# Patient Record
Sex: FEMALE | Race: AMERICAN INDIAN OR ALASKA NATIVE
[De-identification: names, ages, dates, MRNs, and addresses within clinical notes are randomized per-mention and may not be internally consistent; named-entity substitution may affect disease eponyms.]

---

## 2019-04-08 ENCOUNTER — HOSPITAL ENCOUNTER (OUTPATIENT)
Dept: HOSPITAL 5 - MAMMO | Age: 69
Discharge: HOME | End: 2019-04-08
Attending: NURSE PRACTITIONER
Payer: MEDICARE

## 2019-04-08 DIAGNOSIS — Z12.31: Primary | ICD-10-CM

## 2019-04-08 PROCEDURE — 77067 SCR MAMMO BI INCL CAD: CPT

## 2019-04-08 NOTE — MAMMOGRAPHY REPORT
Screening mammogram:



Routine views are obtained and comparison is made to her prior 

examination in September 2011. There is an aggregate of rounded 

partially overlying calcifications in the upper outer right breast with 

increasing degree of calcification compared to prior exam. There are 2 

circumscribed nodules in the right breast the larger of which contains 

a degenerative dense calcification. The nodules have not changed except 

the calcification is new. There is a circumscribed elongated nodule in 

the inferior left breast. This is increased in size from 9.4 mm to 13.8 

mm. The left breast has not otherwise changed. The breasts are 

generally fatty replaced bilaterally.



CAD used.



Impression:



Interval enlargement of left breast nodule.



Recommendation:



Attempts will be made to obtain the patient's interim mammogram for 

comparison. Final recommendation will be made following comparison.



BI-RADS CATEGORY:  0 = Needs additional imaging evaluation



ACR BI-RADS MAMMOGRAPHIC CODES:

0 = Needs additional imaging evaluation; 1 = Negative; 2 = Benign; 3 = 

Probably benign; 4 = Suspicious; 5 = Malignant; 6 = Known biopsy-proven 

malignancy



COMMENT:

      1.   Dense breast tissue, i.e., adenosis, fibrocystic    

            changes, etc., may obscure an underlying neoplasm.

      2.   Approximately 10% of cancers are not detected with

            mammography.

      3.   A negative mammography report should not delay biopsy 

            if a clinically suspicious mass is present.